# Patient Record
Sex: FEMALE | Race: WHITE | NOT HISPANIC OR LATINO | URBAN - METROPOLITAN AREA
[De-identification: names, ages, dates, MRNs, and addresses within clinical notes are randomized per-mention and may not be internally consistent; named-entity substitution may affect disease eponyms.]

---

## 2020-07-01 ENCOUNTER — OFFICE VISIT (OUTPATIENT)
Dept: URBAN - METROPOLITAN AREA CLINIC 82 | Facility: CLINIC | Age: 50
End: 2020-07-01

## 2020-07-22 ENCOUNTER — OFFICE VISIT (OUTPATIENT)
Dept: URBAN - METROPOLITAN AREA CLINIC 82 | Facility: CLINIC | Age: 50
End: 2020-07-22
Payer: COMMERCIAL

## 2020-07-22 ENCOUNTER — DASHBOARD ENCOUNTERS (OUTPATIENT)
Age: 50
End: 2020-07-22

## 2020-07-22 DIAGNOSIS — K86.1 AUTOIMMUNE PANCREATITIS: ICD-10-CM

## 2020-07-22 DIAGNOSIS — K90.0 CELIAC DISEASE: ICD-10-CM

## 2020-07-22 PROCEDURE — G8427 DOCREV CUR MEDS BY ELIG CLIN: HCPCS | Performed by: INTERNAL MEDICINE

## 2020-07-22 PROCEDURE — G9903 PT SCRN TBCO ID AS NON USER: HCPCS | Performed by: INTERNAL MEDICINE

## 2020-07-22 PROCEDURE — 99203 OFFICE O/P NEW LOW 30 MIN: CPT | Performed by: INTERNAL MEDICINE

## 2020-07-22 PROCEDURE — G8420 CALC BMI NORM PARAMETERS: HCPCS | Performed by: INTERNAL MEDICINE

## 2020-07-22 PROCEDURE — 3017F COLORECTAL CA SCREEN DOC REV: CPT | Performed by: INTERNAL MEDICINE

## 2020-07-22 RX ORDER — LIOTHYRONINE SODIUM 5 MCG
1 TABLET ON AN EMPTY STOMACH TABLET ORAL ONCE A DAY
Status: ACTIVE | COMMUNITY

## 2020-07-22 RX ORDER — IBUPROFEN 200 MG
1 TABLET WITH MEALS CAPSULE ORAL TWICE A DAY
Status: ACTIVE | COMMUNITY

## 2020-07-22 RX ORDER — MULTIVIT-MIN/IRON/FOLIC ACID/K 18-600-40
AS DIRECTED CAPSULE ORAL
Status: ACTIVE | COMMUNITY

## 2020-07-22 NOTE — HPI-TODAY'S VISIT:
Ms. Vargas is a very pleasant 50-year-old white female who presents today to establish care with a new GI physician.  She was currently followed by Dr. Mckeon for several years until her insurance changed. She has multiple autoimmune conditions including pots syndrome, Sjogren's syndrome, celiac disease and presumed autoimmune hepatitis. In terms of her celiac she is strictly gluten-free.  If she ingests gluten her symptoms are abdominal pain, nausea, and loose stools to diarrhea.  She was diagnosed approximately 7 years after testing positive for celiac.  She did go back on gluten for about a year with return of symptoms prompting her to go back to gluten-free.  Her thyroid and vitamin D levels are followed by her endocrinologist.  Her last EGD was in 2016.  It was described as normal with the exception of a small hiatal hernia and mild esophagitis.  Her last colonoscopy was in January 2017, at that time she had 2 diminutive polyps and was given a 5-year recall. In terms of her pancreatitis it is normally diagnosed by elevated lipase in the setting of abdominal pain and nausea.  She does not know of any preceding triggers.  She does not consume alcohol.  She states she is not tested positive for the autoimmune pancreatitis markers.  She treats her symptoms with bowel rest until resolution.  Last CT scan was June 4, 2020.  She had a moderate diffuse amount of colonic stool but otherwise normal.  Her pancreas was described as normal.

## 2020-07-23 PROBLEM — 396331005: Status: ACTIVE | Noted: 2020-07-23

## 2021-01-20 ENCOUNTER — OFFICE VISIT (OUTPATIENT)
Dept: URBAN - METROPOLITAN AREA CLINIC 82 | Facility: CLINIC | Age: 51
End: 2021-01-20